# Patient Record
Sex: MALE | URBAN - METROPOLITAN AREA
[De-identification: names, ages, dates, MRNs, and addresses within clinical notes are randomized per-mention and may not be internally consistent; named-entity substitution may affect disease eponyms.]

---

## 2019-11-10 ENCOUNTER — NURSE TRIAGE (OUTPATIENT)
Dept: NURSING | Facility: CLINIC | Age: 64
End: 2019-11-10

## 2019-11-10 NOTE — TELEPHONE ENCOUNTER
Patient currently at [facility] ER, and caller asking about [employer] insurance coverage.  Recommended caller will need to speak to someone at [facility] to find best hospital for best insurance coverage.    Vane Henley RN  [facility]

## 2019-11-10 NOTE — TELEPHONE ENCOUNTER
Reason for Disposition    Nursing judgment or information in reference    Protocols used: NO GUIDELINE [id number]